# Patient Record
(demographics unavailable — no encounter records)

---

## 2024-10-08 NOTE — DISCUSSION/SUMMARY
[Normal Growth] : growth [Normal Development] : development [None] : No known medical problems [No Elimination Concerns] : elimination [No Feeding Concerns] : feeding [No Skin Concerns] : skin [Family Support] : family support [Child Development and Behavior] : child development and behavior [Language Promotion/Hearing] : language promotion/hearing [Toliet Training Readiness] : toliet training readiness [Safety] : safety [No Medications] : ~He/She~ is not on any medications [Mother] : mother [] : The components of the vaccine(s) to be administered today are listed in the plan of care. The disease(s) for which the vaccine(s) are intended to prevent and the risks have been discussed with the caretaker.  The risks are also included in the appropriate vaccination information statements which have been provided to the patient's caregiver.  The caregiver has given consent to vaccinate. [FreeTextEntry1] : Flu vaccine given and discussed. Return on or after 10/17/24 for Hep A 2. Balanced nutrition and safety issues reviewed. Check up at age 2 years.

## 2024-10-08 NOTE — HISTORY OF PRESENT ILLNESS
[Parents] : parents [Cow's milk (Ounces per day ___)] : consumes [unfilled] oz of Cow's milk per day [Fruit] : fruit [Vegetables] : vegetables [Meat] : meat [Eggs] : eggs [Finger Foods] : finger foods [Table food] : table food [Normal] : Normal [No] : Patient does not go to dentist yearly [Car seat in back seat] : Car seat in back seat [Carbon Monoxide Detectors] : Carbon monoxide detectors [Smoke Detectors] : Smoke detectors [Up to date] : Up to date [NO] : No [LastFluorideTreatment] : not yet [de-identified] : Flu vaccine today. Has to wait until 10/17/24 for Hep A #2. [FreeTextEntry1] : Well visit. She is thriving. She eats well, sleeps well, has regular BMs and voids. She is friendly and social. Her growth and development are normal and age appropriate. She speaks both English and [place].

## 2024-10-08 NOTE — PHYSICAL EXAM
[Alert] : alert [No Acute Distress] : no acute distress [Playful] : playful [Normocephalic] : normocephalic [Anterior [place] Closed] : anterior fontanelle closed [Red Reflex Bilateral] : red reflex bilateral [Conjunctivae with no discharge] : conjunctivae with no discharge [Symmetric Light Reflex] : symmetric light reflex [PERRL] : PERRL [EOMI Bilateral] : EOMI bilateral [Normally Placed Ears] : normally placed ears [Auricles Well Formed] : auricles well formed [Clear Tympanic membranes with present light reflex and bony landmarks] : clear tympanic membranes with present light reflex and bony landmarks [No Discharge] : no discharge [Nares Patent] : nares patent [Palate Intact] : palate intact [Uvula Midline] : uvula midline [Tooth Eruption] : tooth eruption  [Supple, full passive range of motion] : supple, full passive range of motion [No Palpable Masses] : no palpable masses [Clear to Auscultation Bilaterally] : clear to auscultation bilaterally [Regular Rate and Rhythm] : regular rate and rhythm [S1, S2 present] : S1, S2 present [No Murmurs] : no murmurs [+2 Femoral Pulses] : +2 femoral pulses [Soft] : soft [NonTender] : non tender [Non Distended] : non distended [Normoactive Bowel Sounds] : normoactive bowel sounds [No Hepatomegaly] : no hepatomegaly [No Splenomegaly] : no splenomegaly [Angel 1] : Angel 1 [Normal Vaginal Introitus] : normal vaginal introitus [Patent] : patent [Normally Placed] : normally placed [No Abnormal Lymph Nodes Palpated] : no abnormal lymph nodes palpated [No Clavicular Crepitus] : no clavicular crepitus [No Spinal Dimple] : no spinal dimple [NoTuft of Hair] : no tuft of hair [Cranial Nerves Grossly Intact] : cranial nerves grossly intact [No Rash or Lesions] : no rash or lesions

## 2024-11-05 NOTE — DISCUSSION/SUMMARY
[] : The components of the vaccine(s) to be administered today are listed in the plan of care. The disease(s) for which the vaccine(s) are intended to prevent and the risks have been discussed with the caretaker.  The risks are also included in the appropriate vaccination information statements which have been provided to the patient's caregiver.  The caregiver has given consent to vaccinate. [FreeTextEntry1] : Hep A #2 given and discussed.

## 2024-11-05 NOTE — HISTORY OF PRESENT ILLNESS
[de-identified] : Hep A #2 today [FreeTextEntry6] : Here for her last Hep A vaccine, she comes in good health with no prior adverse nor allergic vaccine reactions. This office is her medical home.

## 2025-04-03 NOTE — DISCUSSION/SUMMARY
[FreeTextEntry1] : Apply antifungal to affected area BID. Side effect of topical antifungal includes but not limited to worsening erythema. Recommend zinc oxide with every diaper change. Change diaper frequently and allow for periods of no diaper wearing to promote dryness. If no improvement return to office.

## 2025-04-15 NOTE — DISCUSSION/SUMMARY
[Normal Growth] : growth [Normal Development] : development [None] : No known medical problems [No Elimination Concerns] : elimination [No Feeding Concerns] : feeding [No Skin Concerns] : skin [Normal Sleep Pattern] : sleep [Assessment of Language Development] : assessment of language development [Temperament and Behavior] : temperament and behavior [Toilet Training] : toilet training [TV Viewing] : tv viewing [Safety] : safety [No Medications] : ~He/She~ is not on any medications [Mother] : mother [FreeTextEntry1] : Fingerstick blood obtained/prepped/sent to lab for anemia and lead screening. Balanced nutrition, safety issues reviewed. Checkup at age 2.5 years.

## 2025-04-15 NOTE — DEVELOPMENTAL MILESTONES
[Plays alongside other children] : plays alongside other children [Takes off some clothing] : takes off some clothing [Scoops well with spoon] : scoops well with spoon [Combine 2 words into phrase or] : combines 2 words into phrase or sentences [Follows 2-step command] : follows 2-step command [Uses words that are 50% intelligible] : uses words that are 50% intelligible to strangers [Kicks ball] : kicks ball  [Jumps off ground with 2 feet] : jumps off ground with 2 feet [Runs with coordination] : runs with coordination [Climbs up a ladder at a] : climbs up a ladder at a playground [Turns book pages] : turns book pages [Uses hands to turn objects] : uses hands to turn objects [Passed] : passed [Yes] : Completed.

## 2025-04-15 NOTE — PHYSICAL EXAM
[Alert] : alert [No Acute Distress] : no acute distress [Normocephalic] : normocephalic [Anterior [place] Closed] : anterior fontanelle closed [Red Reflex Bilateral] : red reflex bilateral [Symmetric Light Reflex] : symmetric light reflex [PERRL] : PERRL [EOMI Bilateral] : EOMI bilateral [Normally Placed Ears] : normally placed ears [Auricles Well Formed] : auricles well formed [Clear Tympanic membranes with present light reflex and bony landmarks] : clear tympanic membranes with present light reflex and bony landmarks [No Discharge] : no discharge [Nares Patent] : nares patent [Palate Intact] : palate intact [Uvula Midline] : uvula midline [Tooth Eruption] : tooth eruption  [Supple, full passive range of motion] : supple, full passive range of motion [No Palpable Masses] : no palpable masses [Symmetric Chest Rise] : symmetric chest rise [Clear to Auscultation Bilaterally] : clear to auscultation bilaterally [Regular Rate and Rhythm] : regular rate and rhythm [No Murmurs] : no murmurs [+2 Femoral Pulses] : +2 femoral pulses [Soft] : soft [NonTender] : non tender [Non Distended] : non distended [Normoactive Bowel Sounds] : normoactive bowel sounds [No Hepatomegaly] : no hepatomegaly [No Splenomegaly] : no splenomegaly [Angel 1] : Angel 1 [No Clitoromegaly] : no clitoromegaly [Normal Vaginal Introitus] : normal vaginal introitus [Patent] : patent [Normally Placed] : normally placed [No Abnormal Lymph Nodes Palpated] : no abnormal lymph nodes palpated [No Clavicular Crepitus] : no clavicular crepitus [Symmetric Buttocks Creases] : symmetric buttocks creases [No Spinal Dimple] : no spinal dimple [NoTuft of Hair] : no tuft of hair [Cranial Nerves Grossly Intact] : cranial nerves grossly intact [No Rash or Lesions] : no rash or lesions

## 2025-04-15 NOTE — HISTORY OF PRESENT ILLNESS
[Mother] : mother [Fruit] : fruit [Vegetables] : vegetables [Meat] : meat [Cereal] : cereal [Eggs] : eggs [Finger Foods] : finger foods [Table food] : table food [Dairy] : dairy [NO] : No [FreeTextEntry7] : none [de-identified] : none [FreeTextEntry1] : Well visit. She is healthy. NKA. She eats well, sleeps well, has regular BMs and voids. She has had a bad diaper rash along with eczema. Derm is treating her rash with Nystatin and Desonide. She pees on the potty when put there. She is willing to potty train thus far. Her growth is occurring normally and her developmental milestones are age appropriate. She is simultaneously learning Samoan and English.

## 2025-07-15 NOTE — DISCUSSION/SUMMARY
[FreeTextEntry1] : Capillary blood specimen obtained/prepped/sent to lab for repeat CBC. Will F/U results. For now to continue emphasizing diet rich in iron containing foods.

## 2025-07-15 NOTE — HISTORY OF PRESENT ILLNESS
[de-identified] : Follow up [FreeTextEntry6] : Here for F/U of her CBC which three months ago showed hypochromic microcytic anemia. They have given heavy emphasis on iron containing foods and have tried to cut back on her milk consumption. We will repeat her CBC today. There is no known family history of a RBC trait such as Thalassemia.

## 2025-07-15 NOTE — PHYSICAL EXAM
[NL] : no acute distress, alert [Conjuctival Injection] : no conjunctival injection [FreeTextEntry5] : no conjunctival pallor